# Patient Record
Sex: MALE | Race: WHITE | URBAN - METROPOLITAN AREA
[De-identification: names, ages, dates, MRNs, and addresses within clinical notes are randomized per-mention and may not be internally consistent; named-entity substitution may affect disease eponyms.]

---

## 2017-01-30 ENCOUNTER — HISTORICAL (OUTPATIENT)
Dept: ADMINISTRATIVE | Facility: HOSPITAL | Age: 47
End: 2017-01-30

## 2017-05-09 ENCOUNTER — HISTORICAL (OUTPATIENT)
Dept: ADMINISTRATIVE | Facility: HOSPITAL | Age: 47
End: 2017-05-09

## 2017-08-11 ENCOUNTER — HISTORICAL (OUTPATIENT)
Dept: ADMINISTRATIVE | Facility: HOSPITAL | Age: 47
End: 2017-08-11

## 2017-08-17 ENCOUNTER — HISTORICAL (OUTPATIENT)
Dept: HEPATOLOGY | Facility: HOSPITAL | Age: 47
End: 2017-08-17

## 2017-08-21 ENCOUNTER — HISTORICAL (OUTPATIENT)
Dept: ADMINISTRATIVE | Facility: HOSPITAL | Age: 47
End: 2017-08-21

## 2017-09-13 ENCOUNTER — HISTORICAL (OUTPATIENT)
Dept: ADMINISTRATIVE | Facility: HOSPITAL | Age: 47
End: 2017-09-13

## 2017-09-21 ENCOUNTER — HISTORICAL (OUTPATIENT)
Dept: ANESTHESIOLOGY | Facility: HOSPITAL | Age: 47
End: 2017-09-21

## 2017-10-12 ENCOUNTER — HISTORICAL (OUTPATIENT)
Dept: RADIOLOGY | Facility: HOSPITAL | Age: 47
End: 2017-10-12

## 2017-10-19 ENCOUNTER — HISTORICAL (OUTPATIENT)
Dept: LAB | Facility: HOSPITAL | Age: 47
End: 2017-10-19

## 2017-10-20 ENCOUNTER — HOSPITAL ENCOUNTER (OUTPATIENT)
Dept: OCCUPATIONAL THERAPY | Facility: HOSPITAL | Age: 47
End: 2017-10-21
Attending: OTOLARYNGOLOGY | Admitting: OTOLARYNGOLOGY

## 2017-10-25 ENCOUNTER — HISTORICAL (OUTPATIENT)
Dept: ADMINISTRATIVE | Facility: HOSPITAL | Age: 47
End: 2017-10-25

## 2017-10-30 ENCOUNTER — HISTORICAL (OUTPATIENT)
Dept: ADMINISTRATIVE | Facility: HOSPITAL | Age: 47
End: 2017-10-30

## 2017-10-30 LAB
ABS NEUT (OLG): 10.43 X10(3)/MCL (ref 2.1–9.2)
APPEARANCE, UA: CLEAR
BACTERIA #/AREA URNS AUTO: ABNORMAL /HPF
BASOPHILS # BLD AUTO: 0.1 X10(3)/MCL (ref 0–0.2)
BASOPHILS NFR BLD AUTO: 1 %
BILIRUB UR QL STRIP: NEGATIVE
BUN SERPL-MCNC: 35 MG/DL (ref 7–18)
CALCIUM SERPL-MCNC: 6.3 MG/DL (ref 8.5–10.1)
CHLORIDE SERPL-SCNC: 108 MMOL/L (ref 98–107)
CO2 SERPL-SCNC: 26 MMOL/L (ref 21–32)
COLOR UR: YELLOW
CREAT SERPL-MCNC: 2.96 MG/DL (ref 0.7–1.3)
EOSINOPHIL # BLD AUTO: 0.4 X10(3)/MCL (ref 0–0.9)
EOSINOPHIL NFR BLD AUTO: 2 %
ERYTHROCYTE [DISTWIDTH] IN BLOOD BY AUTOMATED COUNT: 14.6 % (ref 11.5–17)
GLUCOSE (UA): ABNORMAL
GLUCOSE SERPL-MCNC: 125 MG/DL (ref 74–106)
HCT VFR BLD AUTO: 33.4 % (ref 42–52)
HGB BLD-MCNC: 10.8 GM/DL (ref 14–18)
HGB UR QL STRIP: NEGATIVE
KETONES UR QL STRIP: NEGATIVE
LEUKOCYTE ESTERASE UR QL STRIP: NEGATIVE
LYMPHOCYTES # BLD AUTO: 3.7 X10(3)/MCL (ref 0.6–4.6)
LYMPHOCYTES NFR BLD AUTO: 22 %
MAGNESIUM SERPL-MCNC: 2.2 MG/DL (ref 1.8–2.4)
MCH RBC QN AUTO: 28.8 PG (ref 27–31)
MCHC RBC AUTO-ENTMCNC: 32.3 GM/DL (ref 33–36)
MCV RBC AUTO: 89.1 FL (ref 80–94)
MONOCYTES # BLD AUTO: 2.4 X10(3)/MCL (ref 0.1–1.3)
MONOCYTES NFR BLD AUTO: 14 %
NEUTROPHILS # BLD AUTO: 10.43 X10(3)/MCL (ref 2.1–9.2)
NEUTROPHILS NFR BLD AUTO: 61 %
NITRITE UR QL STRIP.AUTO: NEGATIVE
PH UR STRIP: 5.5 [PH] (ref 5–9)
PHOSPHATE SERPL-MCNC: 6.8 MG/DL (ref 2.5–4.9)
PLATELET # BLD AUTO: 399 X10(3)/MCL (ref 130–400)
PMV BLD AUTO: 10.1 FL (ref 9.4–12.4)
POTASSIUM SERPL-SCNC: 5.2 MMOL/L (ref 3.5–5.1)
PROT UR QL STRIP: ABNORMAL
RBC # BLD AUTO: 3.75 X10(6)/MCL (ref 4.7–6.1)
RBC #/AREA URNS HPF: ABNORMAL /[HPF]
SODIUM SERPL-SCNC: 145 MMOL/L (ref 136–145)
SP GR UR STRIP: 1.02 (ref 1–1.03)
SQUAMOUS EPITHELIAL, UA: ABNORMAL
UROBILINOGEN UR STRIP-ACNC: 0.2
WBC # SPEC AUTO: 17.1 X10(3)/MCL (ref 4.5–11.5)
WBC #/AREA URNS AUTO: ABNORMAL /HPF (ref 0–3)

## 2017-11-02 ENCOUNTER — HISTORICAL (OUTPATIENT)
Dept: ADMINISTRATIVE | Facility: HOSPITAL | Age: 47
End: 2017-11-02

## 2017-11-02 LAB
ALBUMIN SERPL-MCNC: 2.6 GM/DL (ref 3.4–5)
BUN SERPL-MCNC: 29 MG/DL (ref 7–18)
CALCIUM SERPL-MCNC: 6.4 MG/DL (ref 8.5–10.1)
CHLORIDE SERPL-SCNC: 106 MMOL/L (ref 98–107)
CO2 SERPL-SCNC: 24 MMOL/L (ref 21–32)
CREAT SERPL-MCNC: 2.73 MG/DL (ref 0.7–1.3)
GLUCOSE SERPL-MCNC: 227 MG/DL (ref 74–106)
PHOSPHATE SERPL-MCNC: 6.9 MG/DL (ref 2.5–4.9)
POTASSIUM SERPL-SCNC: 4.8 MMOL/L (ref 3.5–5.1)
SODIUM SERPL-SCNC: 140 MMOL/L (ref 136–145)

## 2017-11-27 ENCOUNTER — HISTORICAL (OUTPATIENT)
Dept: ADMINISTRATIVE | Facility: HOSPITAL | Age: 47
End: 2017-11-27

## 2017-11-27 LAB
ALBUMIN SERPL-MCNC: 3.2 GM/DL (ref 3.4–5)
BUN SERPL-MCNC: 43 MG/DL (ref 7–18)
CALCIUM SERPL-MCNC: 9.7 MG/DL (ref 8.5–10.1)
CHLORIDE SERPL-SCNC: 108 MMOL/L (ref 98–107)
CO2 SERPL-SCNC: 26 MMOL/L (ref 21–32)
CREAT SERPL-MCNC: 3.47 MG/DL (ref 0.7–1.3)
DEPRECATED CALCIDIOL+CALCIFEROL SERPL-MC: 48.45 NG/ML (ref 30–80)
GLUCOSE SERPL-MCNC: 106 MG/DL (ref 74–106)
MAGNESIUM SERPL-MCNC: 1.7 MG/DL (ref 1.8–2.4)
PHOSPHATE SERPL-MCNC: 3.6 MG/DL (ref 2.5–4.9)
POTASSIUM SERPL-SCNC: 4.5 MMOL/L (ref 3.5–5.1)
PTH-INTACT SERPL-MCNC: <0 PG/DL (ref 14–72)
SODIUM SERPL-SCNC: 140 MMOL/L (ref 136–145)
URATE SERPL-MCNC: 11.2 MG/DL (ref 2.6–7.2)

## 2017-12-04 ENCOUNTER — HISTORICAL (OUTPATIENT)
Dept: INFUSION THERAPY | Facility: HOSPITAL | Age: 47
End: 2017-12-04

## 2017-12-05 ENCOUNTER — HISTORICAL (OUTPATIENT)
Dept: INFUSION THERAPY | Facility: HOSPITAL | Age: 47
End: 2017-12-05

## 2017-12-06 ENCOUNTER — HISTORICAL (OUTPATIENT)
Dept: ANESTHESIOLOGY | Facility: HOSPITAL | Age: 47
End: 2017-12-06

## 2017-12-11 ENCOUNTER — HISTORICAL (OUTPATIENT)
Dept: ANESTHESIOLOGY | Facility: HOSPITAL | Age: 47
End: 2017-12-11

## 2017-12-11 LAB
CREAT UR-MCNC: 102 MG/DL
PROT UR STRIP-MCNC: 182.9 MG/DL

## 2018-01-16 ENCOUNTER — HISTORICAL (OUTPATIENT)
Dept: RADIOLOGY | Facility: HOSPITAL | Age: 48
End: 2018-01-16

## 2018-01-16 LAB
ABS NEUT (OLG): 9.09 X10(3)/MCL (ref 2.1–9.2)
ALBUMIN SERPL-MCNC: 2.8 GM/DL (ref 3.4–5)
ANISOCYTOSIS BLD QL SMEAR: 1
APPEARANCE, UA: CLEAR
BACTERIA SPEC CULT: ABNORMAL /HPF
BASOPHILS NFR BLD MANUAL: 1 % (ref 0–2)
BILIRUB UR QL STRIP: NEGATIVE
BUN SERPL-MCNC: 26 MG/DL (ref 7–18)
CALCIUM SERPL-MCNC: 6.8 MG/DL (ref 8.5–10.1)
CHLORIDE SERPL-SCNC: 104 MMOL/L (ref 98–107)
CO2 SERPL-SCNC: 23 MMOL/L (ref 21–32)
COLOR UR: YELLOW
CREAT SERPL-MCNC: 3.24 MG/DL (ref 0.7–1.3)
CREAT UR-MCNC: 156 MG/DL
EOSINOPHIL NFR BLD MANUAL: 3 % (ref 0–8)
ERYTHROCYTE [DISTWIDTH] IN BLOOD BY AUTOMATED COUNT: 13.5 % (ref 11.5–17)
GLUCOSE (UA): NEGATIVE
GLUCOSE SERPL-MCNC: 58 MG/DL (ref 74–106)
HCT VFR BLD AUTO: 33.9 % (ref 42–52)
HGB BLD-MCNC: 10.8 GM/DL (ref 14–18)
HGB UR QL STRIP: NEGATIVE
KETONES UR QL STRIP: NEGATIVE
LEUKOCYTE ESTERASE UR QL STRIP: NEGATIVE
LYMPHOCYTES NFR BLD MANUAL: 26 % (ref 13–40)
MACROCYTES BLD QL SMEAR: 1
MCH RBC QN AUTO: 27.6 PG (ref 27–31)
MCHC RBC AUTO-ENTMCNC: 31.9 GM/DL (ref 33–36)
MCV RBC AUTO: 86.5 FL (ref 80–94)
MONOCYTES NFR BLD MANUAL: 14 % (ref 2–11)
NEUTROPHILS NFR BLD MANUAL: 56 % (ref 47–80)
NITRITE UR QL STRIP: NEGATIVE
PH UR STRIP: 6 [PH] (ref 5–9)
PHOSPHATE SERPL-MCNC: 8.1 MG/DL (ref 2.5–4.9)
PLATELET # BLD AUTO: 582 X10(3)/MCL (ref 130–400)
PLATELET # BLD EST: ABNORMAL 10*3/UL
PMV BLD AUTO: 10.2 FL (ref 7.4–10.4)
POTASSIUM SERPL-SCNC: 5.1 MMOL/L (ref 3.5–5.1)
PROT UR QL STRIP: ABNORMAL
PROT UR STRIP-MCNC: 287.6 MG/DL
PTH-INTACT SERPL-MCNC: <0 PG/DL (ref 14–72)
RBC # BLD AUTO: 3.92 X10(6)/MCL (ref 4.7–6.1)
RBC #/AREA URNS HPF: ABNORMAL /[HPF]
SODIUM SERPL-SCNC: 138 MMOL/L (ref 136–145)
SP GR UR STRIP: 1.01 (ref 1–1.03)
SQUAMOUS EPITHELIAL, UA: ABNORMAL
TARGETS BLD QL SMEAR: 1
UROBILINOGEN UR STRIP-ACNC: 0.2
WBC # SPEC AUTO: 14.9 X10(3)/MCL (ref 4.5–11.5)
WBC #/AREA URNS HPF: ABNORMAL /HPF

## 2018-01-31 ENCOUNTER — HISTORICAL (OUTPATIENT)
Dept: RADIOLOGY | Facility: HOSPITAL | Age: 48
End: 2018-01-31

## 2018-02-15 ENCOUNTER — HISTORICAL (OUTPATIENT)
Dept: ADMINISTRATIVE | Facility: HOSPITAL | Age: 48
End: 2018-02-15

## 2018-03-12 ENCOUNTER — HISTORICAL (OUTPATIENT)
Dept: ADMINISTRATIVE | Facility: HOSPITAL | Age: 48
End: 2018-03-12

## 2018-04-30 ENCOUNTER — HISTORICAL (OUTPATIENT)
Dept: ADMINISTRATIVE | Facility: HOSPITAL | Age: 48
End: 2018-04-30

## 2018-05-14 ENCOUNTER — HISTORICAL (OUTPATIENT)
Dept: ADMINISTRATIVE | Facility: HOSPITAL | Age: 48
End: 2018-05-14

## 2018-06-05 ENCOUNTER — HISTORICAL (OUTPATIENT)
Dept: ADMINISTRATIVE | Facility: HOSPITAL | Age: 48
End: 2018-06-05

## 2022-04-07 ENCOUNTER — HISTORICAL (OUTPATIENT)
Dept: ADMINISTRATIVE | Facility: HOSPITAL | Age: 52
End: 2022-04-07

## 2022-04-24 VITALS
DIASTOLIC BLOOD PRESSURE: 78 MMHG | HEIGHT: 76 IN | WEIGHT: 297 LBS | OXYGEN SATURATION: 99 % | BODY MASS INDEX: 36.17 KG/M2 | SYSTOLIC BLOOD PRESSURE: 136 MMHG

## 2022-04-28 NOTE — DISCHARGE SUMMARY
DISCHARGE DATE:  10/21/2017    CHIEF COMPLAINT:    1. Papillary thyroid carcinoma.  2. Multinodular goiter with compressive symptoms.    HISTORY OF PRESENT ILLNESS:  Reji Mcclendon comes in with papillary thyroid carcinoma, diagnosed on FNA.  He also has a large multinodular goiter with compressive symptoms.  He underwent total thyroidectomy with right central compartment neck dissection and had an uneventful postoperative course.  He is tolerating an oral diet, ambulating, voiding and pain is controlled.  His drain output is slightly higher than where I would like it to be, so he was going to discharge home with drain care instructions.  No hypocalcemic symptoms.    PHYSICAL EXAMINATION:  NECK:  Neck incision is flat.  No fluid collection.  Drain is intact with appropriate drainage.  Voice is strong.    IMPRESSION:    1. Papillary thyroid carcinoma.  2. Multinodular goiter with compressive symptoms.    DISCHARGE INSTRUCTIONS:  Mr. Mendoza is going to discharge home today.  I will see him in clinic next week either in one week for routine follow up or sooner to have his drain removed if they would like.  Otherwise, they have the instructions and the means to pull it at home if they want to do that and they have my phone number to get in touch with me if they need.        ______________________________  MD ERIS Pedroza Jr./AUBREY  DD:  10/21/2017  Time:  09:11AM  DT:  10/22/2017  Time:  06:57AM  Job #:  [id number]

## 2022-04-28 NOTE — OP NOTE
DATE OF SURGERY:    10/20/2017    SURGEON:  Cheng Arroyo Jr., MD    PREOPERATIVE DIAGNOSES:    1. Papillary thyroid carcinoma.   2. Multinodular goiter with compressive symptoms.    POSTOPERATIVE DIAGNOSES:    1. Papillary thyroid carcinoma.   2. Multinodular goiter with compressive symptoms.    INDICATION:  This is a very pleasant, 47-year-old who was biopsied from his right-sided isthmus mass consistent with papillary thyroid carcinoma.  He was seen by Dr. Fredis Gunter and referred to me for operative management and elected to undergo total thyroidectomy and possible central compartment neck dissection.    PROCEDURES:    1. Total thyroidectomy.   2. Right central compartment neck dissection.    ASSISTANT:  Tomi Hernandez    ANESTHESIA:  General.    COMPLICATIONS:  None.    ESTIMATED BLOOD LOSS:  150 ml.    DRAINS:  One #10 flat JING drain.    PROCEDURE IN DETAIL:  The patient was brought to the Operating Room and was identified by name and clinic number.  He was transferred to the Operating Room table in supine position and general anesthesia was induced.  Oral endotracheal intubation was uncomplicated.  The bed was then positioned for surgery.  Neurodiagnostic monitoring services were present throughout the entirety of the case monitoring the NIMs for the recurrent laryngeal nerve function.  Once the patient was positioned, he was prepped and draped in the usual sterile fashion for surgery.     An incision was made about two fingerbreadths above the sternal notch.  Once the incision was carried through the subcutaneous tissues, superior and inferior flaps were elevated.  The strap musculature was encountered with large thyroid isthmus underneath it.  Care was taken to slowly dissect the straps off with attention to looking for any strap musculature invasion.  I did not see any strap muscle involvement.  The strap musculature peeled off without issue.  Once the strap muscles were dissected off laterally,  the large goiter was exposed with the tumor in the isthmus.  I first began by dissecting the pyramidal lobe up off of the thyroid ala and looked for any delphian nodes in the area to take down with the main specimen.  Once the thyroid ala was free of all thyroid tissue, I then began working on the right side of the thyroid.       The right superior pedicle was identified and ligated.  Dissection continued around the capsule of the large right-sided thyroid gland.  Middle thyroid vein was ligated and the tumor was rolled out from the neck.  Dissection continued along the capsule inferiorly to the trachea, and then dissection within the tracheoesophageal groove readily identified the right-sided recurrent laryngeal nerve.  This was identified going into the cricothyroid joint and the thyroid tissue just anterior to this was taken off of the trachea for clean dissection.  There was no appreciable or significant amount of thyroid tissue left near the joint.  Once this was rolled off anteriorly, the right lobe was dissected free from the isthmus and isthmus was dissected free from the left lobe with harmonic scalpel.  The right lobe and isthmus were sent as separate specimens.  The frozen section pathology of this came back positive for a 4 cm papillary cancer in the isthmus as we expected.  No other malignancy identified in the right lobe.       Attention was turned to the left thyroid lobe in the same fashion and the superior pedicle and middle thyroid vein were ligated.  Dissection along the capsule ensued until the recurrent laryngeal nerve on the left side was identified.  It was traced going into the cricothyroid joint and the thyroid was removed just anterior to this off of the trachea for a clean dissection as well.     After the thyroid was removed, post resection stimulation of the bilateral recurrent laryngeal nerves showed that they were in good working order without any significant decrease in amplitude.        Attention was then paid carefully to the central compartments bilaterally and also level 7 just above the sternum.  There was a small node with a slightly bluish hue in the right central compartment just behind the recurrent laryngeal nerve.  Giving this finding, a right central compartment neck was performed.  The nerve was dissected out further inferiorly and the lymph node packet between the carotid artery, innominate artery and the trachea were taken without any adverse sequela to the recurrent laryngeal nerve.  I did look for parathyroid glands and did not identify any parathyroid tissue in this dissection.  This was also taken with level 7 lymph nodes, fat and thymus for completeness sake, and sent off as a specimen.       Careful inspection of the left central compartment did not reveal any significant lymph nodes that I could appreciate, but just thymus tissue most inferiorly.  For that reason, we did not pursue any significant level-6 dissection on the left.     At this point, the wound was copiously irrigated out.  Valsalva was performed and there was no identifiable bleeding.  A drain was placed and the strap musculature was closed back together with 3-0 Vicryl.  The wound was then closed with 3-0 Vicryl and Dermabond.  The patient was then turned back over to the anesthesia to wake up.  He woke up without complication and returned to Recovery Room in stable condition.        ______________________________  MD ERIS Pedroza Jr./PONCE  DD:  10/20/2017  Time:  11:41AM  DT:  10/20/2017  Time:  01:00PM  Job #:  [id number]

## 2022-04-28 NOTE — H&P
CHIEF COMPLAINT:    1. Papillary thyroid carcinoma.   2. Multinodular goiter with compressive symptoms.    HISTORY OF PRESENT ILLNESS:  This is a very pleasant, 47-year-old gentleman referred to see me by Dr. Fredis Gunter after undergoing an ultrasound guided FNA of papillary thyroid carcinoma and his isthmus.  This was in the setting of a large multinodular goiter with compressive symptoms.  After I evaluated him in the office, reviewed CT scan and ultrasound imaging, we decided to proceed with total thyroidectomy with possible central compartment neck dissection.    REVIEW OF SYSTEMS:  Otherwise negative.    PAST MEDICAL HISTORY:  Diabetes, chronic kidney disease, polyneuropathy, obstructive sleep apnea, hypertension and hyperlipidemia.    PHYSICAL EXAMINATION:  GENERAL:  Alert, oriented and in no acute distress. Voice is strong.  Cough is strong.     NECK:  Without crepitus.  He has a large palpable thyroid.  No overlying skin involvement.  He has fair range of motion of his neck.    IMPRESSION:    1. Papillary thyroid carcinoma.   2. Multinodular goiter with compressive symptoms.      PLAN:  We will proceed today with total thyroidectomy and possible central compartment neck dissection.  Plan is to stay over night for drain management and to observe his calcium levels.        ______________________________  MD ERIS Pedroza Jr./PONCE  DD:  10/20/2017  Time:  11:44AM  DT:  10/20/2017  Time:  12:00PM  Job #:  [id number]    The H&P was reviewed, the patient was examined, and the following changes to the patients condition are noted:  ______________________________________________________________________________  ______________________________________________________________________________  ______________________________________________________________________________  [  ] No changes to the patient's condition:      ______________________________                                              ___________________  PHYSICIAN SIGNATURE                                                             DATE/TIME